# Patient Record
Sex: MALE | Race: WHITE | URBAN - METROPOLITAN AREA
[De-identification: names, ages, dates, MRNs, and addresses within clinical notes are randomized per-mention and may not be internally consistent; named-entity substitution may affect disease eponyms.]

---

## 2017-05-15 ENCOUNTER — COMMUNICATION - HEALTHEAST (OUTPATIENT)
Dept: INTERNAL MEDICINE | Facility: CLINIC | Age: 42
End: 2017-05-15

## 2017-05-15 DIAGNOSIS — I10 HYPERTENSION: ICD-10-CM

## 2017-08-16 ENCOUNTER — COMMUNICATION - HEALTHEAST (OUTPATIENT)
Dept: INTERNAL MEDICINE | Facility: CLINIC | Age: 42
End: 2017-08-16

## 2017-08-16 DIAGNOSIS — I10 HYPERTENSION: ICD-10-CM

## 2017-11-06 ENCOUNTER — COMMUNICATION - HEALTHEAST (OUTPATIENT)
Dept: INTERNAL MEDICINE | Facility: CLINIC | Age: 42
End: 2017-11-06

## 2017-11-06 DIAGNOSIS — I10 HYPERTENSION: ICD-10-CM

## 2017-11-07 ENCOUNTER — COMMUNICATION - HEALTHEAST (OUTPATIENT)
Dept: INTERNAL MEDICINE | Facility: CLINIC | Age: 42
End: 2017-11-07

## 2017-11-07 DIAGNOSIS — I10 HYPERTENSION: ICD-10-CM

## 2017-11-27 ENCOUNTER — OFFICE VISIT (OUTPATIENT)
Dept: FAMILY MEDICINE | Facility: OTHER | Age: 42
End: 2017-11-27
Attending: NURSE PRACTITIONER
Payer: COMMERCIAL

## 2017-11-27 VITALS
OXYGEN SATURATION: 98 % | HEART RATE: 97 BPM | TEMPERATURE: 98.4 F | WEIGHT: 315 LBS | HEIGHT: 68 IN | BODY MASS INDEX: 47.74 KG/M2 | RESPIRATION RATE: 18 BRPM | SYSTOLIC BLOOD PRESSURE: 140 MMHG | DIASTOLIC BLOOD PRESSURE: 80 MMHG

## 2017-11-27 DIAGNOSIS — E66.01 MORBID OBESITY (H): ICD-10-CM

## 2017-11-27 DIAGNOSIS — I10 BENIGN ESSENTIAL HYPERTENSION: Primary | ICD-10-CM

## 2017-11-27 DIAGNOSIS — Z23 NEED FOR PROPHYLACTIC VACCINATION AND INOCULATION AGAINST INFLUENZA: ICD-10-CM

## 2017-11-27 PROCEDURE — 90686 IIV4 VACC NO PRSV 0.5 ML IM: CPT | Performed by: NURSE PRACTITIONER

## 2017-11-27 PROCEDURE — 90471 IMMUNIZATION ADMIN: CPT | Performed by: NURSE PRACTITIONER

## 2017-11-27 PROCEDURE — 99202 OFFICE O/P NEW SF 15 MIN: CPT | Mod: 25 | Performed by: NURSE PRACTITIONER

## 2017-11-27 RX ORDER — HYDROCHLOROTHIAZIDE 25 MG/1
25 TABLET ORAL DAILY
Qty: 90 TABLET | Refills: 3 | Status: SHIPPED | OUTPATIENT
Start: 2017-11-27

## 2017-11-27 RX ORDER — ATENOLOL 100 MG/1
100 TABLET ORAL DAILY
Qty: 90 TABLET | Refills: 1 | Status: SHIPPED | OUTPATIENT
Start: 2017-11-27 | End: 2018-05-16

## 2017-11-27 RX ORDER — ATENOLOL 50 MG/1
50 TABLET ORAL DAILY
COMMUNITY
End: 2017-11-27

## 2017-11-27 ASSESSMENT — ANXIETY QUESTIONNAIRES
3. WORRYING TOO MUCH ABOUT DIFFERENT THINGS: NOT AT ALL
1. FEELING NERVOUS, ANXIOUS, OR ON EDGE: NOT AT ALL
IF YOU CHECKED OFF ANY PROBLEMS ON THIS QUESTIONNAIRE, HOW DIFFICULT HAVE THESE PROBLEMS MADE IT FOR YOU TO DO YOUR WORK, TAKE CARE OF THINGS AT HOME, OR GET ALONG WITH OTHER PEOPLE: NOT DIFFICULT AT ALL
7. FEELING AFRAID AS IF SOMETHING AWFUL MIGHT HAPPEN: NOT AT ALL
4. TROUBLE RELAXING: NOT AT ALL
GAD7 TOTAL SCORE: 0
2. NOT BEING ABLE TO STOP OR CONTROL WORRYING: NOT AT ALL
6. BECOMING EASILY ANNOYED OR IRRITABLE: NOT AT ALL
5. BEING SO RESTLESS THAT IT IS HARD TO SIT STILL: NOT AT ALL

## 2017-11-27 ASSESSMENT — PAIN SCALES - GENERAL: PAINLEVEL: NO PAIN (0)

## 2017-11-27 ASSESSMENT — PATIENT HEALTH QUESTIONNAIRE - PHQ9: SUM OF ALL RESPONSES TO PHQ QUESTIONS 1-9: 3

## 2017-11-27 NOTE — PROGRESS NOTES
Injectable Influenza Immunization Documentation    1.  Is the person to be vaccinated sick today?   No    2. Does the person to be vaccinated have an allergy to a component   of the vaccine?   No  Egg Allergy Algorithm Link    3. Has the person to be vaccinated ever had a serious reaction   to influenza vaccine in the past?  Never had one before    4. Has the person to be vaccinated ever had Guillain-Barré syndrome?   No    Form completed by Pamela M Lechevalier LPN

## 2017-11-27 NOTE — NURSING NOTE
"Chief Complaint   Patient presents with     Establish Care       Initial /78 (BP Location: Left arm, Patient Position: Chair, Cuff Size: Adult Large)  Pulse 97  Temp 98.4  F (36.9  C) (Tympanic)  Resp 18  Ht 5' 8\" (1.727 m)  Wt (!) 420 lb (190.5 kg)  SpO2 98%  BMI 63.86 kg/m2 Estimated body mass index is 63.86 kg/(m^2) as calculated from the following:    Height as of this encounter: 5' 8\" (1.727 m).    Weight as of this encounter: 420 lb (190.5 kg).  Medication Reconciliation: complete   Pamela M Lechevalier LPN      "

## 2017-11-27 NOTE — PROGRESS NOTES
SUBJECTIVE:   Gibran Carrasquillo is a 42 year old male who presents to clinic today for the following health issues:  Establish Care    Gibran presents to [facility] care.  He has not been seen in over one year.  He was following in the [facility], moved to the [place] one year ago.  He has been treated for hypertension, pre-diabetes and obesity.  He was working through work-up and preparation for gastric bypass in the [facility], was just cleared for surgery when he had the opportunity to retun to his hometown area for work.  He is requesting referral to bariatric program as he is still interested in bypass.              Problem list and histories reviewed & adjusted, as indicated.  Additional history: as documented    Patient Active Problem List   Diagnosis     Benign essential hypertension     Morbid obesity (H)     History reviewed. No pertinent surgical history.    Social History   Substance Use Topics     Smoking status: Never Smoker     Smokeless tobacco: Never Used     Alcohol use Not on file      Comment: a few times a year     Family History   Problem Relation Age of Onset     Obesity Mother      Hypertension Maternal Grandfather      Coronary Artery Disease Maternal Grandfather      Obesity Maternal Grandfather          Current Outpatient Prescriptions   Medication Sig Dispense Refill     atenolol (TENORMIN) 50 MG tablet Take 50 mg by mouth daily       HYDROCHLOROTHIAZIDE PO Take 25 mg by mouth daily       No Known Allergies  No lab results found.   BP Readings from Last 3 Encounters:   11/27/17 148/78    Wt Readings from Last 3 Encounters:   11/27/17 (!) 420 lb (190.5 kg)              Reviewed and updated as needed this visit by clinical staffTobacco  Allergies  Med Hx  Surg Hx  Fam Hx  Soc Hx      Reviewed and updated as needed this visit by Provider         ROS:  Constitutional, HEENT, cardiovascular, pulmonary, gi and gu systems are negative, except as otherwise noted.      OBJECTIVE:   /78 (BP  "Location: Left arm, Patient Position: Chair, Cuff Size: Adult Large)  Pulse 97  Temp 98.4  F (36.9  C) (Tympanic)  Resp 18  Ht 5' 8\" (1.727 m)  Wt (!) 420 lb (190.5 kg)  SpO2 98%  BMI 63.86 kg/m2  Body mass index is 63.86 kg/(m^2).  GENERAL: healthy, alert, no distress and obese  NECK: no adenopathy, no asymmetry, masses, or scars and thyroid normal to palpation  RESP: lungs clear to auscultation - no rales, rhonchi or wheezes  CV: regular rate and rhythm, normal S1 S2, no S3 or S4, no murmur, click or rub, no peripheral edema and peripheral pulses strong  MS: no gross musculoskeletal defects noted, no edema  PSYCH: mentation appears normal, affect normal/bright      ASSESSMENT/PLAN:       1. Benign essential hypertension  chronic  - Lipid Profile; Future  - TSH with free T4 reflex; Future  - Basic metabolic panel; Future  - Hemoglobin A1c; Future  - increase atenolol (TENORMIN) 100 MG tablet; Take 1 tablet (100 mg) by mouth daily  Dispense: 90 tablet; Refill: 1  - hydrochlorothiazide (HYDRODIURIL) 25 MG tablet; Take 1 tablet (25 mg) by mouth daily  Dispense: 90 tablet; Refill: 3  - start 81mg aspirin daily    2. Morbid obesity (H)  Weight management referral  - BARIATRIC ADULT REFERRAL [facility]    3. Need for prophylactic vaccination and inoculation against influenza  routine  - FLU VAC, SPLIT VIRUS IM > 3 YO (QUADRIVALENT) [76415]  - Vaccine Administration, Initial [98890]    FUTURE APPOINTMENTS:       - Follow-up visit in 6 months, one week for nurse only blood pressure recheck.    Zara Rogers NP  [facility]    "

## 2017-11-27 NOTE — PATIENT INSTRUCTIONS
ASSESSMENT/PLAN:       1. Benign essential hypertension  chronic  - Lipid Profile; Future  - TSH with free T4 reflex; Future  - Basic metabolic panel; Future  - Hemoglobin A1c; Future  - increase atenolol (TENORMIN) 100 MG tablet; Take 1 tablet (100 mg) by mouth daily  Dispense: 90 tablet; Refill: 1  - hydrochlorothiazide (HYDRODIURIL) 25 MG tablet; Take 1 tablet (25 mg) by mouth daily  Dispense: 90 tablet; Refill: 3  - start 81mg aspirin daily    2. Morbid obesity (H)  Weight management referral  - BARIATRIC ADULT REFERRAL - [facility]    3. Need for prophylactic vaccination and inoculation against influenza  routine  - FLU VAC, SPLIT VIRUS IM > 3 YO (QUADRIVALENT) [19799]  - Vaccine Administration, Initial [46649]    FUTURE APPOINTMENTS:       - Follow-up visit in 6 months, one week for nurse only blood pressure recheck.    Zara Rogers NP  [facility]

## 2017-11-27 NOTE — MR AVS SNAPSHOT
After Visit Summary   11/27/2017    Gibran Carrasquillo    MRN: [id number]           Patient Information     Date Of Birth          1975        Visit Information        Provider Department      11/27/2017 3:00 PM Zara Rogers NP [facility]        Today's Diagnoses     Benign essential hypertension    -  1    Morbid obesity (H)        Need for prophylactic vaccination and inoculation against influenza          Care Instructions      ASSESSMENT/PLAN:       1. Benign essential hypertension  chronic  - Lipid Profile; Future  - TSH with free T4 reflex; Future  - Basic metabolic panel; Future  - Hemoglobin A1c; Future  - increase atenolol (TENORMIN) 100 MG tablet; Take 1 tablet (100 mg) by mouth daily  Dispense: 90 tablet; Refill: 1  - hydrochlorothiazide (HYDRODIURIL) 25 MG tablet; Take 1 tablet (25 mg) by mouth daily  Dispense: 90 tablet; Refill: 3  - start 81mg aspirin daily    2. Morbid obesity (H)  Weight management referral  - BARIATRIC ADULT REFERRAL - [facility]    3. Need for prophylactic vaccination and inoculation against influenza  routine  - FLU VAC, SPLIT VIRUS IM > 3 YO (QUADRIVALENT) [20325]  - Vaccine Administration, Initial [31305]    FUTURE APPOINTMENTS:       - Follow-up visit in 6 months, one week for nurse only blood pressure recheck.    Zara Rogers NP  [facility]            Follow-ups after your visit        Additional Services     BARIATRIC ADULT REFERRAL       Your provider has referred you to: [facility] - would like to be seen in [place] if possible.      Please be aware that coverage of these services is subject to the terms and limitations of your health insurance plan.  Call member services at your health plan with any benefit or coverage questions.      Please bring the following with you to your appointment:      (1) List of current medications   (2) This referral request   (3) Any documents/labs given to you for this  referral                  Follow-up notes from your care team     Return in about 6 months (around 5/27/2018), or if symptoms worsen or fail to improve.      Future tests that were ordered for you today     Open Future Orders        Priority Expected Expires Ordered    Lipid Profile Routine 11/27/2017 12/27/2017 11/27/2017    TSH with free T4 reflex Routine 11/27/2017 12/27/2017 11/27/2017    Basic metabolic panel Routine 11/27/2017 12/27/2017 11/27/2017    Hemoglobin A1c Routine 11/27/2017 12/27/2017 11/27/2017            Who to contact     If you have questions or need follow up information about today's clinic visit or your schedule please contact [facility] directly at [phone].  Normal or non-critical lab and imaging results will be communicated to you by MyChart, letter or phone within 4 business days after the clinic has received the results. If you do not hear from us within 7 days, please contact the clinic through [employer] or phone. If you have a critical or abnormal lab result, we will notify you by phone as soon as possible.  Submit refill requests through [employer] or call your pharmacy and they will forward the refill request to us. Please allow 3 business days for your refill to be completed.          Additional Information About Your Visit        [employer] Information     [employer] gives you secure access to your electronic health record. If you see a primary care provider, you can also send messages to your care team and make appointments. If you have questions, please call your primary care clinic.  If you do not have a primary care provider, please call [phone] and they will assist you.        Care EveryWhere ID     This is your Care EveryWhere ID. This could be used by other organizations to access your [facility] medical records  [id number]        Your Vitals Were     Pulse Temperature Respirations Height Pulse Oximetry BMI (Body Mass Index)    97 98.4  F (36.9  C) (Tympanic) 18  "5' 8\" (1.727 m) 98% 63.86 kg/m2       Blood Pressure from Last 3 Encounters:   11/27/17 140/80    Weight from Last 3 Encounters:   11/27/17 (!) 420 lb (190.5 kg)              We Performed the Following     BARIATRIC ADULT REFERRAL     FLU VAC, SPLIT VIRUS IM > 3 YO (QUADRIVALENT) [02675]     Vaccine Administration, Initial [[id number]]          Today's Medication Changes          These changes are accurate as of: 11/27/17  6:53 PM.  If you have any questions, ask your nurse or doctor.               These medicines have changed or have updated prescriptions.        Dose/Directions    atenolol 100 MG tablet   Commonly known as:  TENORMIN   This may have changed:    - medication strength  - how much to take   Used for:  Benign essential hypertension   Changed by:  Zara Rogers NP        Dose:  100 mg   Take 1 tablet (100 mg) by mouth daily   Quantity:  90 tablet   Refills:  1       hydrochlorothiazide 25 MG tablet   Commonly known as:  HYDRODIURIL   This may have changed:  medication strength   Used for:  Benign essential hypertension   Changed by:  Zara Rogers NP        Dose:  25 mg   Take 1 tablet (25 mg) by mouth daily   Quantity:  90 tablet   Refills:  3            Where to get your medicines      [facility] [id number] [place] [address]  AT [facility] [address]  [address] [facility] [address]     Phone:  [phone]     atenolol 100 MG tablet    hydrochlorothiazide 25 MG tablet                Primary Care Provider Office Phone # Fax #    Nilay ODALYS Mendoza [phone]       [facility] [address]  [facility] [address]        Equal Access to Services     JAMILA YEAGER AH: Hadii maribel sanchez Sopaola, waaxda luqadaha, qaybta kaalmada adeegyada, yaima gaming. So [facility] [phone].    ATENCIÓN: Si habla español, tiene a rodriguez disposición servicios gratuitos de " asistencia lingüística. Jesus Manuel al [phone].    We comply with applicable federal civil rights laws and [place] laws. We do not discriminate on the basis of race, color, national origin, age, disability, sex, sexual orientation, or gender identity.            Thank you!     Thank you for choosing [facility]  for your care. Our goal is always to provide you with excellent care. Hearing back from our patients is one way we can continue to improve our services. Please take a few minutes to complete the written survey that you may receive in the mail after your visit with us. Thank you!             Your Updated Medication List - Protect others around you: Learn how to safely use, store and throw away your medicines at www.disposemymeds.org.          This list is accurate as of: 11/27/17  6:53 PM.  Always use your most recent med list.                   Brand Name Dispense Instructions for use Diagnosis    aspirin 81 MG EC tablet     90 tablet    Take 1 tablet (81 mg) by mouth daily    Benign essential hypertension       atenolol 100 MG tablet    TENORMIN    90 tablet    Take 1 tablet (100 mg) by mouth daily    Benign essential hypertension       hydrochlorothiazide 25 MG tablet    HYDRODIURIL    90 tablet    Take 1 tablet (25 mg) by mouth daily    Benign essential hypertension

## 2017-11-28 ASSESSMENT — ANXIETY QUESTIONNAIRES: GAD7 TOTAL SCORE: 0

## 2018-05-02 ENCOUNTER — TELEPHONE (OUTPATIENT)
Dept: FAMILY MEDICINE | Facility: OTHER | Age: 43
End: 2018-05-02

## 2018-05-02 NOTE — TELEPHONE ENCOUNTER
Left message for pt to call have only seen him once and need to see if he is still planning on seeing us   Pamela M Lechevalier LPN

## 2018-05-16 DIAGNOSIS — I10 BENIGN ESSENTIAL HYPERTENSION: ICD-10-CM

## 2018-05-17 RX ORDER — ATENOLOL 100 MG/1
TABLET ORAL
Qty: 90 TABLET | Refills: 1 | Status: SHIPPED | OUTPATIENT
Start: 2018-05-17 | End: 2019-05-24

## 2018-05-17 NOTE — TELEPHONE ENCOUNTER
Atenolol  Last Written Prescription Date: 11/27/17  Last Fill Quantity: 90 # of Refills: 1  Last Office Visit: 11/27/17

## 2019-05-24 DIAGNOSIS — I10 BENIGN ESSENTIAL HYPERTENSION: ICD-10-CM

## 2019-05-24 RX ORDER — ATENOLOL 100 MG/1
TABLET ORAL
Qty: 30 TABLET | Refills: 0 | Status: SHIPPED | OUTPATIENT
Start: 2019-05-24

## 2019-05-24 NOTE — TELEPHONE ENCOUNTER
atenolol (TENORMIN) 100 MG tablet      Last Written Prescription Date:  5/17/18  Last Fill Quantity: 90,   # refills: 1  Last Office Visit: 11/27/17  Future Office visit:       Routing refill request to provider for review/approval because:  OVERDUE for office visit    Letter sent. Please advise. Thank you!

## 2019-05-24 NOTE — LETTER
May 24, 2019      Gibran MASON Labarge  [address] [place] [id number]        Dear Gibran,     APPOINTMENT REMINDER:   Our records indicates that it is time for you to be seen for a yearly follow up appointment.     We CANNOT continue to refill your Atenolol unless you are seen for an office visit.   Taking care of your health is important to us, and ongoing visits with your provider are vital to your care.    We look forward to seeing you in the near future.  You may call our office at [phone] to schedule a visit.     Please disregard this notice if you have already made an appointment.      Sincerely,  Zara Rogers NP

## 2020-03-02 ENCOUNTER — HEALTH MAINTENANCE LETTER (OUTPATIENT)
Age: 45
End: 2020-03-02

## 2020-12-20 ENCOUNTER — HEALTH MAINTENANCE LETTER (OUTPATIENT)
Age: 45
End: 2020-12-20

## 2021-04-18 ENCOUNTER — HEALTH MAINTENANCE LETTER (OUTPATIENT)
Age: 46
End: 2021-04-18

## 2021-10-03 ENCOUNTER — HEALTH MAINTENANCE LETTER (OUTPATIENT)
Age: 46
End: 2021-10-03

## 2022-05-14 ENCOUNTER — HEALTH MAINTENANCE LETTER (OUTPATIENT)
Age: 47
End: 2022-05-14

## 2022-09-04 ENCOUNTER — HEALTH MAINTENANCE LETTER (OUTPATIENT)
Age: 47
End: 2022-09-04

## 2023-06-03 ENCOUNTER — HEALTH MAINTENANCE LETTER (OUTPATIENT)
Age: 48
End: 2023-06-03